# Patient Record
Sex: MALE | Race: WHITE | HISPANIC OR LATINO | Employment: FULL TIME | URBAN - METROPOLITAN AREA
[De-identification: names, ages, dates, MRNs, and addresses within clinical notes are randomized per-mention and may not be internally consistent; named-entity substitution may affect disease eponyms.]

---

## 2019-06-25 ENCOUNTER — HOSPITAL ENCOUNTER (EMERGENCY)
Facility: MEDICAL CENTER | Age: 34
End: 2019-06-25
Attending: EMERGENCY MEDICINE
Payer: COMMERCIAL

## 2019-06-25 ENCOUNTER — APPOINTMENT (OUTPATIENT)
Dept: RADIOLOGY | Facility: MEDICAL CENTER | Age: 34
End: 2019-06-25
Attending: EMERGENCY MEDICINE
Payer: COMMERCIAL

## 2019-06-25 VITALS
HEIGHT: 69 IN | DIASTOLIC BLOOD PRESSURE: 93 MMHG | OXYGEN SATURATION: 96 % | RESPIRATION RATE: 17 BRPM | BODY MASS INDEX: 28.57 KG/M2 | HEART RATE: 69 BPM | WEIGHT: 192.9 LBS | SYSTOLIC BLOOD PRESSURE: 165 MMHG | TEMPERATURE: 97.4 F

## 2019-06-25 DIAGNOSIS — R07.9 CHEST PAIN, UNSPECIFIED TYPE: ICD-10-CM

## 2019-06-25 LAB
ALBUMIN SERPL BCP-MCNC: 4.4 G/DL (ref 3.2–4.9)
ALBUMIN/GLOB SERPL: 1 G/DL
ALP SERPL-CCNC: 72 U/L (ref 30–99)
ALT SERPL-CCNC: 46 U/L (ref 2–50)
ANION GAP SERPL CALC-SCNC: 11 MMOL/L (ref 0–11.9)
AST SERPL-CCNC: 43 U/L (ref 12–45)
BASOPHILS # BLD AUTO: 1.1 % (ref 0–1.8)
BASOPHILS # BLD: 0.04 K/UL (ref 0–0.12)
BILIRUB SERPL-MCNC: 1.3 MG/DL (ref 0.1–1.5)
BUN SERPL-MCNC: 13 MG/DL (ref 8–22)
CALCIUM SERPL-MCNC: 9 MG/DL (ref 8.4–10.2)
CHLORIDE SERPL-SCNC: 99 MMOL/L (ref 96–112)
CO2 SERPL-SCNC: 25 MMOL/L (ref 20–33)
CREAT SERPL-MCNC: 0.73 MG/DL (ref 0.5–1.4)
EKG IMPRESSION: NORMAL
EOSINOPHIL # BLD AUTO: 0.09 K/UL (ref 0–0.51)
EOSINOPHIL NFR BLD: 2.4 % (ref 0–6.9)
ERYTHROCYTE [DISTWIDTH] IN BLOOD BY AUTOMATED COUNT: 46 FL (ref 35.9–50)
GLOBULIN SER CALC-MCNC: 4.2 G/DL (ref 1.9–3.5)
GLUCOSE SERPL-MCNC: 95 MG/DL (ref 65–99)
HCT VFR BLD AUTO: 47.9 % (ref 42–52)
HGB BLD-MCNC: 16.3 G/DL (ref 14–18)
IMM GRANULOCYTES # BLD AUTO: 0.01 K/UL (ref 0–0.11)
IMM GRANULOCYTES NFR BLD AUTO: 0.3 % (ref 0–0.9)
LYMPHOCYTES # BLD AUTO: 1.41 K/UL (ref 1–4.8)
LYMPHOCYTES NFR BLD: 38 % (ref 22–41)
MCH RBC QN AUTO: 30.8 PG (ref 27–33)
MCHC RBC AUTO-ENTMCNC: 34 G/DL (ref 33.7–35.3)
MCV RBC AUTO: 90.4 FL (ref 81.4–97.8)
MONOCYTES # BLD AUTO: 0.54 K/UL (ref 0–0.85)
MONOCYTES NFR BLD AUTO: 14.6 % (ref 0–13.4)
NEUTROPHILS # BLD AUTO: 1.62 K/UL (ref 1.82–7.42)
NEUTROPHILS NFR BLD: 43.6 % (ref 44–72)
NRBC # BLD AUTO: 0 K/UL
NRBC BLD-RTO: 0 /100 WBC
PLATELET # BLD AUTO: 232 K/UL (ref 164–446)
PMV BLD AUTO: 10 FL (ref 9–12.9)
POTASSIUM SERPL-SCNC: 3.6 MMOL/L (ref 3.6–5.5)
PROT SERPL-MCNC: 8.6 G/DL (ref 6–8.2)
RBC # BLD AUTO: 5.3 M/UL (ref 4.7–6.1)
SODIUM SERPL-SCNC: 135 MMOL/L (ref 135–145)
TROPONIN I SERPL-MCNC: <0.02 NG/ML (ref 0–0.04)
WBC # BLD AUTO: 3.7 K/UL (ref 4.8–10.8)

## 2019-06-25 PROCEDURE — 36415 COLL VENOUS BLD VENIPUNCTURE: CPT

## 2019-06-25 PROCEDURE — 93005 ELECTROCARDIOGRAM TRACING: CPT

## 2019-06-25 PROCEDURE — 700102 HCHG RX REV CODE 250 W/ 637 OVERRIDE(OP): Performed by: EMERGENCY MEDICINE

## 2019-06-25 PROCEDURE — 84484 ASSAY OF TROPONIN QUANT: CPT

## 2019-06-25 PROCEDURE — 700105 HCHG RX REV CODE 258: Performed by: EMERGENCY MEDICINE

## 2019-06-25 PROCEDURE — A9270 NON-COVERED ITEM OR SERVICE: HCPCS | Performed by: EMERGENCY MEDICINE

## 2019-06-25 PROCEDURE — 99284 EMERGENCY DEPT VISIT MOD MDM: CPT

## 2019-06-25 PROCEDURE — 85025 COMPLETE CBC W/AUTO DIFF WBC: CPT

## 2019-06-25 PROCEDURE — 80053 COMPREHEN METABOLIC PANEL: CPT

## 2019-06-25 PROCEDURE — 71045 X-RAY EXAM CHEST 1 VIEW: CPT

## 2019-06-25 RX ORDER — SODIUM CHLORIDE 9 MG/ML
1000 INJECTION, SOLUTION INTRAVENOUS ONCE
Status: COMPLETED | OUTPATIENT
Start: 2019-06-25 | End: 2019-06-25

## 2019-06-25 RX ORDER — ASPIRIN 325 MG
325 TABLET ORAL ONCE
Status: COMPLETED | OUTPATIENT
Start: 2019-06-25 | End: 2019-06-25

## 2019-06-25 RX ORDER — IBUPROFEN 200 MG
400 TABLET ORAL EVERY 6 HOURS PRN
COMMUNITY

## 2019-06-25 RX ADMIN — SODIUM CHLORIDE 1000 ML: 9 INJECTION, SOLUTION INTRAVENOUS at 11:36

## 2019-06-25 RX ADMIN — ASPIRIN 325 MG ORAL TABLET 325 MG: 325 PILL ORAL at 11:35

## 2019-06-25 ASSESSMENT — LIFESTYLE VARIABLES: DO YOU DRINK ALCOHOL: NO

## 2019-06-25 NOTE — ED NOTES
Med rec updated and complete  Allergies reviewed  Interviewed pt with wife at bedside with permission from pt.  Pt reports no prescription medications.  Pt reports no antibiotics in the last 2 weeks.

## 2019-06-25 NOTE — ED NOTES
Rounded on patient.  Oriented to the room call light within reach, bed in lowest position. Patient updated on plan of care, no additional needs at this time. Will continue to monitor.  Line and lab established.

## 2019-06-25 NOTE — ED NOTES
Discharge information reviewed in detail. Patient verbalized understanding of discharge instructions to establish a PCP and to return to ER if condition worsens. Self to drive home.   Patient ambulated out of ER in a steady gait.

## 2019-06-25 NOTE — ED PROVIDER NOTES
"ED Provider Note    CHIEF COMPLAINT  Chief Complaint   Patient presents with   • Chest Pain       HPI  Josue Ingram is a 34 y.o. male who presents for evaluation of chest pain.  He has had issues with having chest pain for quite some time 3 years ago he was admitted to the hospital and had extensive cardiac evaluation for this pain ultimately they figured he had an intolerance to shellfish which was causing some significant reflux since then he has avoided it and it is been much better.  This weekend he was celebrating with a friend and had for a number of drinks.  He has been experiencing chest pain since last night.  He describes it is more of a squeezing type of pain that is worse with movement not necessarily exertion no associated shortness of breath or nausea.  He has had some shakiness.  He also reports having a fair amount of stress recently.  He just got .      REVIEW OF SYSTEMS  positive for chest pain shakiness, negative for nausea vomiting shortness of breath. All other systems are negative.     PAST MEDICAL HISTORY       SOCIAL HISTORY  Social History     Social History Main Topics   • Smoking status: Never Smoker   • Smokeless tobacco: Never Used   • Alcohol use Yes   • Drug use: No   • Sexual activity: Not on file       SURGICAL HISTORY   has a past surgical history that includes other orthopedic surgery.    CURRENT MEDICATIONS  Home Medications     Reviewed by Jaquan Nicholson (Pharmacy Tech) on 06/25/19 at 1102  Med List Status: Complete   Medication Last Dose Status   Cholecalciferol (VITAMIN D3 PO) > 2 weeks Active   ibuprofen (MOTRIN) 200 MG Tab 6/24/2019 Active                ALLERGIES  Allergies   Allergen Reactions   • Shellfish Allergy Swelling     Pt reports that he swells up and that he gets a knot in his stomach.       PHYSICAL EXAM  VITAL SIGNS: BP (!) 165/93   Pulse 69   Temp 36.3 °C (97.4 °F) (Temporal)   Resp 17   Ht 1.753 m (5' 9\")   Wt 87.5 kg (192 lb 14.4 oz)  "  SpO2 96%   BMI 28.49 kg/m² .  Constitutional: Alert in no apparent distress.  HENT: No signs of trauma, Bilateral external ears normal, Nose normal.  Dry mucous membranes  Eyes: Pupils are equal and reactive, Conjunctiva normal, Non-icteric.   Neck: Normal range of motion, No tenderness, Supple, No stridor.   Cardiovascular: Regular rate and rhythm, no murmurs.   Thorax & Lungs: Normal breath sounds, No respiratory distress, No wheezing, No chest tenderness.   Abdomen: Bowel sounds normal, Soft, No tenderness, No masses, No peritoneal signs.  Skin: Warm, Dry, No erythema, No rash.   Musculoskeletal:  no major deformities noted.   Neurologic: Alert,  No focal deficits noted.   Psychiatric: Affect normal, Judgment normal, Mood normal.       DIAGNOSTIC STUDIES / PROCEDURES      EKG  12 Lead EKG interpreted by me to show:  Normal sinus rhythm  Rate 95  Axis: Normal  Intervals: Normal  T wave inversion leads II III, aVF, V5 V6 with some J-point elevation V1 through V4  My impression of this EKG: Does not indicate ischemia or arrythmia at this time.  Prior EKG brought by patient from 2016 indicates similar pattern.      LABS  Labs Reviewed   CBC WITH DIFFERENTIAL - Abnormal; Notable for the following:        Result Value    WBC 3.7 (*)     Neutrophils-Polys 43.60 (*)     Monocytes 14.60 (*)     Neutrophils (Absolute) 1.62 (*)     All other components within normal limits   COMP METABOLIC PANEL - Abnormal; Notable for the following:     Total Protein 8.6 (*)     Globulin 4.2 (*)     All other components within normal limits   TROPONIN   ESTIMATED GFR       RADIOLOGY  DX-CHEST-PORTABLE (1 VIEW)   Final Result         1. No acute cardiopulmonary abnormalities are identified.              COURSE & MEDICAL DECISION MAKING  Pertinent Labs & Imaging studies reviewed. (See chart for details)  This is a 34-year-old gentleman who presents with chest pain.  Differential includes but is not limited to atypical chest pain, ACS,  stress.    Records from 2016 were brought by the patient there is an exercise stress test that was negative they did note a 1/2 mm ST depression noted in the inferior leads that resolved with recovery.    Labs here show normal troponin.  Labs are otherwise unremarkable EKG is unchanged from previous and chest x-ray is negative.  He has a heart score of 1.  Given this normal troponin given his symptoms have been lasting since last night I do think this is reassuring.  I feel he is otherwise low risk for ACS and can be discharged with follow-up with primary care provider.  Patient is agreeable to this.    HYDRATION: Based on the patient's presentation of Dehydration the patient was given IV fluids. IV Hydration was used because oral hydration was not as rapid as required. Upon recheck following hydration, the patient was Improved.       The patient will return for new or worsening symptoms and is stable at the time of discharge. Patient was given return precautions. Patient and/or family member verbalizes understanding and will comply.    DISPOSITION:  Patient will be discharged home in stable condition.    FOLLOW UP:  [facility] Emergency Dept  [address]  [facility] [address] [phone]    Return for worsening chest pain, shortness of breath, fevers or other concerns    primary care      Please follow-up your blood pressure was elevated      OUTPATIENT MEDICATIONS:  Discharge Medication List as of 6/25/2019  1:02 PM            Your blood pressure is elevated here in the emergency department. Please monitor your blood pressure over the next several days. If your blood pressure continues to be 120/80 or higher please contact your physician for blood pressure management.       FINAL IMPRESSION  1. Chest pain, unspecified type        2.   3.    This dictation has been creating using voice recognition software. The accuracy of the dictation is limited the abilities of the software.   I expect there may be some errors of grammar and possibly content. I made every attempt to manually correct the errors within my dictation. However errors related to this voice recognition software may still exist and should be interpreted within the appropriate context.      The note accurately reflects work and decisions made by me.  Veronica Lees  6/25/2019  2:40 PM

## 2019-06-26 ENCOUNTER — TELEPHONE (OUTPATIENT)
Dept: SCHEDULING | Facility: IMAGING CENTER | Age: 34
End: 2019-06-26

## 2019-07-22 ENCOUNTER — OFFICE VISIT (OUTPATIENT)
Dept: MEDICAL GROUP | Facility: MEDICAL CENTER | Age: 34
End: 2019-07-22
Payer: COMMERCIAL

## 2019-07-22 VITALS
SYSTOLIC BLOOD PRESSURE: 112 MMHG | OXYGEN SATURATION: 94 % | RESPIRATION RATE: 16 BRPM | DIASTOLIC BLOOD PRESSURE: 80 MMHG | HEART RATE: 80 BPM | HEIGHT: 69 IN | WEIGHT: 180.78 LBS | BODY MASS INDEX: 26.78 KG/M2 | TEMPERATURE: 97.9 F

## 2019-07-22 DIAGNOSIS — Z76.89 ENCOUNTER TO ESTABLISH CARE: ICD-10-CM

## 2019-07-22 DIAGNOSIS — E55.9 VITAMIN D DEFICIENCY: ICD-10-CM

## 2019-07-22 DIAGNOSIS — R07.9 CHEST PAIN, UNSPECIFIED TYPE: ICD-10-CM

## 2019-07-22 DIAGNOSIS — Z91.013 SHELLFISH ALLERGY: ICD-10-CM

## 2019-07-22 DIAGNOSIS — Z00.00 PREVENTATIVE HEALTH CARE: ICD-10-CM

## 2019-07-22 PROCEDURE — 99214 OFFICE O/P EST MOD 30 MIN: CPT | Performed by: PHYSICIAN ASSISTANT

## 2019-07-22 RX ORDER — OMEPRAZOLE 20 MG/1
20 CAPSULE, DELAYED RELEASE ORAL DAILY
Qty: 30 CAP | Refills: 2 | Status: SHIPPED | OUTPATIENT
Start: 2019-07-22

## 2019-07-22 ASSESSMENT — PATIENT HEALTH QUESTIONNAIRE - PHQ9: CLINICAL INTERPRETATION OF PHQ2 SCORE: 0

## 2019-07-22 NOTE — ASSESSMENT & PLAN NOTE
This is a 34-year-old male who is here today to establish care.  Has a chronic history of intermittent chest pain.  Was seen in the [place] by cardiology.  Had a stress test that was negative.  Supposedly had an echocardiogram that was negative.  Chest pain is unspecified but it does occur in the left side.  He was recently seen at the ER and troponin level was normal.  EKG was considered normal.  There is a thought that possibly his chest pain was secondary to reflux.  In the [place] he had an upper endoscopy.  He is also started on omeprazole.  His chest pain did dissipate but he moved into [place] recently.  Complains of stress at times.  Is a  at the [facility] [employer].  They are currently renovating the [facility].

## 2019-07-22 NOTE — ASSESSMENT & PLAN NOTE
Vitamin D level was at an 8.  He was started on high dose of vitamin D.  Currently is taking 10,000 units of vitamin D one day a week.  No recent follow-up level.

## 2019-07-22 NOTE — PROGRESS NOTES
Subjective:   Josue Ingram is a 34 y.o. male here today for establishing care, chronic history of intermittent chest pain, shellfish allergy and vitamin D deficiency.    Chest pain  This is a 34-year-old male who is here today to establish care.  Has a chronic history of intermittent chest pain.  Was seen in the [place] by cardiology.  Had a stress test that was negative.  Supposedly had an echocardiogram that was negative.  Chest pain is unspecified but it does occur in the left side.  He was recently seen at the ER and troponin level was normal.  EKG was considered normal.  There is a thought that possibly his chest pain was secondary to reflux.  In the [place] he had an upper endoscopy.  He is also started on omeprazole.  His chest pain did dissipate but he moved into [place] recently.  Complains of stress at times.  Is a  at the [employer].  They are currently renovating the [employer] [employer] [employer].    Shellfish allergy  During the work-up of his chest pain he was told that he an allergy to shellfish.  This could be contributing to his chest pain.  He gave up shellfish.  He is requesting a referral to see an allergist.    Vitamin D deficiency  Vitamin D level was at an 8.  He was started on high dose of vitamin D.  Currently is taking 10,000 units of vitamin D one day a week.  No recent follow-up level.       Current medicines (including changes today)  Current Outpatient Prescriptions   Medication Sig Dispense Refill   • aspirin EC (ECOTRIN) 81 MG Tablet Delayed Response Take 81 mg by mouth every day.     • omeprazole (PRILOSEC) 20 MG delayed-release capsule Take 1 Cap by mouth every day. 1/2 hour prior to same meal. 30 Cap 2   • Cholecalciferol (VITAMIN D3 PO) Take 1 Cap by mouth every day.     • ibuprofen (MOTRIN) 200 MG Tab Take 400 mg by mouth every 6 hours as needed for Mild Pain.       No current facility-administered medications for this visit.      He  has no past medical history on file.    Social History and  "Family History were reviewed and updated.    ROS   No chest pain, no shortness of breath, no abdominal pain and all other systems were reviewed and are negative.       Objective:     /80 (BP Location: Left arm, Patient Position: Sitting, BP Cuff Size: Adult)   Pulse 80   Temp 36.6 °C (97.9 °F) (Temporal)   Resp 16   Ht 1.753 m (5' 9\")   Wt 82 kg (180 lb 12.4 oz)   SpO2 94%  Body mass index is 26.7 kg/m².   Physical Exam:  Constitutional: Alert, no distress.  Skin: Warm, dry, good turgor, no rashes in visible areas.  Eye: Equal, round and reactive, conjunctiva clear, lids normal.  ENMT: Lips without lesions, good dentition, oropharynx clear.  Neck: Trachea midline, no masses.   Lymph: No cervical or supraclavicular lymphadenopathy  Respiratory: Unlabored respiratory effort, lungs clear to auscultation, no wheezes, no ronchi.  Cardiovascular: Normal S1, S2, no murmur, no edema.  Abdomen: Soft, non-tender, no masses.  Psych: Alert and oriented x3, normal affect and mood.        Assessment and Plan:   The following treatment plan was discussed    1. Chest pain, unspecified type  Chronic condition.  Intermittent.  Cardiac versus noncardiac.  Advised to start omeprazole 20 mg half hour prior to the same meal daily.  Referred to cardiology for evaluation.  Suspect of chest pain dramatically improved on the omeprazole likely this is reflux or gas production.  - REFERRAL TO CARDIOLOGY  - omeprazole (PRILOSEC) 20 MG delayed-release capsule; Take 1 Cap by mouth every day. 1/2 hour prior to same meal.  Dispense: 30 Cap; Refill: 2    2. Shellfish allergy  Chronic condition.  Status unknown.  Ordered shellfish allergy profile.  Will not defer to allergy.  - ALLERGEN, SHELLFISH PROFILE; Future    3. Vitamin D deficiency  Chronic condition.  Status unknown.  Continue vitamin D supplement.  Ordered vitamin D level.  - VITAMIN D,25 HYDROXY; Future    4. Preventative health care  Team.  He will be contacted.  - Lipid " Profile; Future  - Comp Metabolic Panel; Future    5. Encounter to establish care      Followup: Return in about 3 months (around 10/22/2019).    Please note that this dictation was created using voice recognition software. I have made every reasonable attempt to correct obvious errors, but I expect that there are errors of grammar and possibly content that I did not discover before finalizing the note.

## 2019-07-22 NOTE — ASSESSMENT & PLAN NOTE
During the work-up of his chest pain he was told that he an allergy to shellfish.  This could be contributing to his chest pain.  He gave up shellfish.  He is requesting a referral to see an allergist.

## 2019-08-15 ENCOUNTER — HOSPITAL ENCOUNTER (OUTPATIENT)
Dept: LAB | Facility: MEDICAL CENTER | Age: 34
End: 2019-08-15
Attending: PHYSICIAN ASSISTANT
Payer: COMMERCIAL

## 2019-08-15 DIAGNOSIS — Z00.00 PREVENTATIVE HEALTH CARE: ICD-10-CM

## 2019-08-15 DIAGNOSIS — E55.9 VITAMIN D DEFICIENCY: ICD-10-CM

## 2019-08-15 DIAGNOSIS — Z91.013 SHELLFISH ALLERGY: ICD-10-CM

## 2019-08-15 LAB
25(OH)D3 SERPL-MCNC: 15 NG/ML (ref 30–100)
ALBUMIN SERPL BCP-MCNC: 4.4 G/DL (ref 3.2–4.9)
ALBUMIN/GLOB SERPL: 1.3 G/DL
ALP SERPL-CCNC: 65 U/L (ref 30–99)
ALT SERPL-CCNC: 37 U/L (ref 2–50)
ANION GAP SERPL CALC-SCNC: 7 MMOL/L (ref 0–11.9)
AST SERPL-CCNC: 25 U/L (ref 12–45)
BILIRUB SERPL-MCNC: 0.7 MG/DL (ref 0.1–1.5)
BUN SERPL-MCNC: 13 MG/DL (ref 8–22)
CALCIUM SERPL-MCNC: 9.6 MG/DL (ref 8.5–10.5)
CHLORIDE SERPL-SCNC: 104 MMOL/L (ref 96–112)
CHOLEST SERPL-MCNC: 168 MG/DL (ref 100–199)
CO2 SERPL-SCNC: 28 MMOL/L (ref 20–33)
CREAT SERPL-MCNC: 0.72 MG/DL (ref 0.5–1.4)
FASTING STATUS PATIENT QL REPORTED: NORMAL
GLOBULIN SER CALC-MCNC: 3.5 G/DL (ref 1.9–3.5)
GLUCOSE SERPL-MCNC: 95 MG/DL (ref 65–99)
HDLC SERPL-MCNC: 50 MG/DL
LDLC SERPL CALC-MCNC: 96 MG/DL
POTASSIUM SERPL-SCNC: 3.9 MMOL/L (ref 3.6–5.5)
PROT SERPL-MCNC: 7.9 G/DL (ref 6–8.2)
SODIUM SERPL-SCNC: 139 MMOL/L (ref 135–145)
TRIGL SERPL-MCNC: 110 MG/DL (ref 0–149)

## 2019-08-15 PROCEDURE — 80053 COMPREHEN METABOLIC PANEL: CPT

## 2019-08-15 PROCEDURE — 80061 LIPID PANEL: CPT

## 2019-08-15 PROCEDURE — 82306 VITAMIN D 25 HYDROXY: CPT

## 2019-08-15 PROCEDURE — 86003 ALLG SPEC IGE CRUDE XTRC EA: CPT | Mod: 91

## 2019-08-15 PROCEDURE — 36415 COLL VENOUS BLD VENIPUNCTURE: CPT

## 2019-08-18 LAB
BLUE MUSSEL IGE QN: <0.1 KU/L
CLAM IGE QN: <0.1 KU/L
CRAB IGE QN: 0.77 KU/L
DEPRECATED MISC ALLERGEN IGE RAST QL: ABNORMAL
LOBSTER IGE QN: 0.6 KU/L
OYSTER IGE QN: <0.1 KU/L
SCALLOP IGE QN: <0.1 KU/L
SHRIMP IGE QN: 4.37 KU/L

## 2019-08-19 DIAGNOSIS — R79.89 ABNORMAL CBC: ICD-10-CM

## 2019-08-21 ENCOUNTER — TELEPHONE (OUTPATIENT)
Dept: MEDICAL GROUP | Facility: MEDICAL CENTER | Age: 34
End: 2019-08-21

## 2019-08-21 ENCOUNTER — NON-PROVIDER VISIT (OUTPATIENT)
Dept: MEDICAL GROUP | Facility: MEDICAL CENTER | Age: 34
End: 2019-08-21
Payer: COMMERCIAL

## 2019-08-21 DIAGNOSIS — Z23 IMMUNIZATION DUE: ICD-10-CM

## 2019-08-21 PROCEDURE — 90471 IMMUNIZATION ADMIN: CPT | Performed by: NURSE PRACTITIONER

## 2019-08-21 PROCEDURE — 90715 TDAP VACCINE 7 YRS/> IM: CPT | Performed by: NURSE PRACTITIONER

## 2019-08-21 NOTE — TELEPHONE ENCOUNTER
Patient is on the [facility] Schedule today for T-Dap vaccine/injection.    SPECIFIC Action To Be Taken: Orders pending, please sign.

## 2019-08-21 NOTE — NON-PROVIDER
"Fadi Ingram is a 34 y.o. male here for a non-provider visit for:   TDAP    Reason for immunization: continue or complete series started at the office  Immunization records indicate need for vaccine: Yes, confirmed with Epic  Minimum interval has been met for this vaccine: Yes  ABN completed: Not Indicated    Order and dose verified by: TRACEY BIANCHI  VIS Dated  02/24/2015 was given to patient: Yes  All IAC Questionnaire questions were answered \"No.\"    Patient tolerated injection and no adverse effects were observed or reported: Yes    Pt scheduled for next dose in series: Not Indicated    "

## 2019-08-22 ENCOUNTER — APPOINTMENT (OUTPATIENT)
Dept: MEDICAL GROUP | Facility: MEDICAL CENTER | Age: 34
End: 2019-08-22
Payer: COMMERCIAL

## 2019-09-16 ENCOUNTER — OFFICE VISIT (OUTPATIENT)
Dept: CARDIOLOGY | Facility: MEDICAL CENTER | Age: 34
End: 2019-09-16
Payer: COMMERCIAL

## 2019-09-16 VITALS
OXYGEN SATURATION: 95 % | HEART RATE: 92 BPM | DIASTOLIC BLOOD PRESSURE: 88 MMHG | WEIGHT: 178 LBS | SYSTOLIC BLOOD PRESSURE: 128 MMHG | HEIGHT: 69 IN | BODY MASS INDEX: 26.36 KG/M2

## 2019-09-16 DIAGNOSIS — R94.31 ABNORMAL EKG: ICD-10-CM

## 2019-09-16 DIAGNOSIS — R07.2 PRECORDIAL PAIN: ICD-10-CM

## 2019-09-16 PROCEDURE — 93000 ELECTROCARDIOGRAM COMPLETE: CPT | Performed by: INTERNAL MEDICINE

## 2019-09-16 PROCEDURE — 99204 OFFICE O/P NEW MOD 45 MIN: CPT | Performed by: INTERNAL MEDICINE

## 2019-09-16 RX ORDER — TRAZODONE HYDROCHLORIDE 50 MG/1
TABLET ORAL
COMMUNITY
Start: 2018-02-21 | End: 2020-02-21

## 2019-09-16 ASSESSMENT — ENCOUNTER SYMPTOMS
NEUROLOGICAL NEGATIVE: 1
CONSTITUTIONAL NEGATIVE: 1
HEARTBURN: 1
MUSCULOSKELETAL NEGATIVE: 1
SHORTNESS OF BREATH: 1
PSYCHIATRIC NEGATIVE: 1
EYES NEGATIVE: 1

## 2019-09-16 NOTE — PROGRESS NOTES
Chief Complaint   Patient presents with   • Chest Pain       Subjective:   Fadi Ingram is a 34 y.o. male who presents today for evaluation of chest pain    He is seen in consultation at the request of Crescencio Cramer PA-C for above issue.  He has no known major medical problem.  He used to work out regularly.  He has been experiencing chest pain for the last few months.  He described them as picnhing pain in the left lower chest, lasting a minute or so  They occur randomly without associated symptoms.  Denies chest trauma or recent flu-like symptoms.    His sister has open heart surgery for congenital heart disease but no FH of premature CAD.    Has been busy and not sleeping well    EKG 6/25 by my review showed sinus rhythm with high QRS voltage and mild inferolateral ST abnormalities    LDL last month 96, HDL 50      History reviewed. No pertinent past medical history.  Past Surgical History:   Procedure Laterality Date   • OTHER ORTHOPEDIC SURGERY       Family History   Problem Relation Age of Onset   • Heart Disease Mother    • Heart Disease Sister      Social History     Socioeconomic History   • Marital status:      Spouse name: Not on file   • Number of children: Not on file   • Years of education: Not on file   • Highest education level: Not on file   Occupational History   • Not on file   Social Needs   • Financial resource strain: Not on file   • Food insecurity:     Worry: Not on file     Inability: Not on file   • Transportation needs:     Medical: Not on file     Non-medical: Not on file   Tobacco Use   • Smoking status: Never Smoker   • Smokeless tobacco: Never Used   Substance and Sexual Activity   • Alcohol use: Yes     Alcohol/week: 4.2 oz     Types: 7 Glasses of wine per week   • Drug use: No   • Sexual activity: Yes     Partners: Female     Comment: , no children   Lifestyle   • Physical activity:     Days per week: Not on file     Minutes per session: Not on file   • Stress: Not on  "file   Relationships   • Social connections:     Talks on phone: Not on file     Gets together: Not on file     Attends Tenriism service: Not on file     Active member of club or organization: Not on file     Attends meetings of clubs or organizations: Not on file     Relationship status: Not on file   • Intimate partner violence:     Fear of current or ex partner: Not on file     Emotionally abused: Not on file     Physically abused: Not on file     Forced sexual activity: Not on file   Other Topics Concern   • Not on file   Social History Narrative   • Not on file     Allergies   Allergen Reactions   • Shellfish Allergy Swelling     Pt reports that he swells up and that he gets a knot in his stomach.     Outpatient Encounter Medications as of 9/16/2019   Medication Sig Dispense Refill   • traZODone (DESYREL) 50 MG Tab Take  by mouth.     • aspirin EC (ECOTRIN) 81 MG Tablet Delayed Response Take 81 mg by mouth every day.     • omeprazole (PRILOSEC) 20 MG delayed-release capsule Take 1 Cap by mouth every day. 1/2 hour prior to same meal. 30 Cap 2   • Cholecalciferol (VITAMIN D3 PO) Take 1 Cap by mouth every day.     • ibuprofen (MOTRIN) 200 MG Tab Take 400 mg by mouth every 6 hours as needed for Mild Pain.       No facility-administered encounter medications on file as of 9/16/2019.      Review of Systems   Constitutional: Negative.    HENT: Negative.    Eyes: Negative.    Respiratory: Positive for shortness of breath.    Cardiovascular: Positive for chest pain.   Gastrointestinal: Positive for heartburn.        Better after taking omeprazole   Genitourinary: Negative.    Musculoskeletal: Negative.    Skin: Negative.    Neurological: Negative.    Endo/Heme/Allergies: Negative.    Psychiatric/Behavioral: Negative.    All other systems reviewed and are negative.       Objective:   /88 (BP Location: Left arm, Patient Position: Sitting, BP Cuff Size: Adult)   Pulse 92   Ht 1.753 m (5' 9\")   Wt 80.7 kg (178 lb) "   SpO2 95%   BMI 26.29 kg/m²     Physical Exam   Constitutional: He is oriented to person, place, and time. He appears well-developed and well-nourished. No distress.   HENT:   Head: Normocephalic and atraumatic.   Eyes: Right eye exhibits no discharge. Left eye exhibits no discharge.   Neck: No JVD present. No thyromegaly present.   Cardiovascular: Normal rate and normal heart sounds.   Pulmonary/Chest: Effort normal and breath sounds normal. He exhibits no tenderness.   Abdominal: Soft. There is no tenderness.   Musculoskeletal: He exhibits no edema or deformity.   Neurological: He is alert and oriented to person, place, and time.   Skin: Skin is warm. No erythema.   Psychiatric: He has a normal mood and affect.     Repeated EKG today by my review unchanged from 6/25    Assessment:     1. Precordial pain  EKG    EC-ECHOCARDIOGRAM REST/STRESS W/ CONT   2. Abnormal EKG  EC-ECHOCARDIOGRAM REST/STRESS W/ CONT       Medical Decision Making:  Today's Assessment / Status / Plan:     His chest pain is atypical. He is rather concerned about it due to the fact that he likes to be very active. Will arrange for stress test. He does have abnormal resting EKG.  Will obtain stress echocardiography which will also allow us to assess for LV wall thichness and rule out hypertrophic cardiomyopathy/athelerth heart type issue.  We will keep you posted about our findings and further recommendations as they become available. Please also do not hesitate to call for any questions.  Thank you kindly for allowing me to participate in the care of this patient.

## 2019-09-17 LAB — EKG IMPRESSION: NORMAL

## 2019-10-18 ENCOUNTER — HOSPITAL ENCOUNTER (OUTPATIENT)
Dept: LAB | Facility: MEDICAL CENTER | Age: 34
End: 2019-10-18
Attending: PHYSICIAN ASSISTANT
Payer: COMMERCIAL

## 2019-10-18 ENCOUNTER — OFFICE VISIT (OUTPATIENT)
Dept: MEDICAL GROUP | Facility: MEDICAL CENTER | Age: 34
End: 2019-10-18
Payer: COMMERCIAL

## 2019-10-18 VITALS
BODY MASS INDEX: 27.43 KG/M2 | RESPIRATION RATE: 12 BRPM | TEMPERATURE: 98.9 F | DIASTOLIC BLOOD PRESSURE: 80 MMHG | HEIGHT: 69 IN | SYSTOLIC BLOOD PRESSURE: 162 MMHG | OXYGEN SATURATION: 98 % | HEART RATE: 85 BPM | WEIGHT: 185.19 LBS

## 2019-10-18 DIAGNOSIS — R53.83 OTHER FATIGUE: ICD-10-CM

## 2019-10-18 DIAGNOSIS — R55 SYNCOPE, UNSPECIFIED SYNCOPE TYPE: ICD-10-CM

## 2019-10-18 DIAGNOSIS — E55.9 VITAMIN D DEFICIENCY: ICD-10-CM

## 2019-10-18 DIAGNOSIS — R56.9 SEIZURE-LIKE ACTIVITY (HCC): ICD-10-CM

## 2019-10-18 DIAGNOSIS — R07.2 PRECORDIAL PAIN: ICD-10-CM

## 2019-10-18 LAB
ALBUMIN SERPL BCP-MCNC: 4.5 G/DL (ref 3.2–4.9)
ALBUMIN/GLOB SERPL: 1.5 G/DL
ALP SERPL-CCNC: 69 U/L (ref 30–99)
ALT SERPL-CCNC: 37 U/L (ref 2–50)
ANION GAP SERPL CALC-SCNC: 5 MMOL/L (ref 0–11.9)
AST SERPL-CCNC: 27 U/L (ref 12–45)
BASOPHILS # BLD AUTO: 1 % (ref 0–1.8)
BASOPHILS # BLD: 0.05 K/UL (ref 0–0.12)
BILIRUB SERPL-MCNC: 0.5 MG/DL (ref 0.1–1.5)
BUN SERPL-MCNC: 11 MG/DL (ref 8–22)
CALCIUM SERPL-MCNC: 9.2 MG/DL (ref 8.5–10.5)
CHLORIDE SERPL-SCNC: 103 MMOL/L (ref 96–112)
CK SERPL-CCNC: 159 U/L (ref 0–154)
CO2 SERPL-SCNC: 26 MMOL/L (ref 20–33)
CREAT SERPL-MCNC: 0.68 MG/DL (ref 0.5–1.4)
EOSINOPHIL # BLD AUTO: 0.06 K/UL (ref 0–0.51)
EOSINOPHIL NFR BLD: 1.2 % (ref 0–6.9)
ERYTHROCYTE [DISTWIDTH] IN BLOOD BY AUTOMATED COUNT: 42.5 FL (ref 35.9–50)
FASTING STATUS PATIENT QL REPORTED: NORMAL
GLOBULIN SER CALC-MCNC: 3 G/DL (ref 1.9–3.5)
GLUCOSE SERPL-MCNC: 82 MG/DL (ref 65–99)
HCT VFR BLD AUTO: 47.1 % (ref 42–52)
HGB BLD-MCNC: 15.4 G/DL (ref 14–18)
IMM GRANULOCYTES # BLD AUTO: 0.02 K/UL (ref 0–0.11)
IMM GRANULOCYTES NFR BLD AUTO: 0.4 % (ref 0–0.9)
LYMPHOCYTES # BLD AUTO: 2.18 K/UL (ref 1–4.8)
LYMPHOCYTES NFR BLD: 45.2 % (ref 22–41)
MCH RBC QN AUTO: 30.4 PG (ref 27–33)
MCHC RBC AUTO-ENTMCNC: 32.7 G/DL (ref 33.7–35.3)
MCV RBC AUTO: 92.9 FL (ref 81.4–97.8)
MONOCYTES # BLD AUTO: 0.59 K/UL (ref 0–0.85)
MONOCYTES NFR BLD AUTO: 12.2 % (ref 0–13.4)
NEUTROPHILS # BLD AUTO: 1.92 K/UL (ref 1.82–7.42)
NEUTROPHILS NFR BLD: 40 % (ref 44–72)
NRBC # BLD AUTO: 0 K/UL
NRBC BLD-RTO: 0 /100 WBC
PLATELET # BLD AUTO: 212 K/UL (ref 164–446)
PMV BLD AUTO: 11 FL (ref 9–12.9)
POTASSIUM SERPL-SCNC: 3.8 MMOL/L (ref 3.6–5.5)
PROT SERPL-MCNC: 7.5 G/DL (ref 6–8.2)
RBC # BLD AUTO: 5.07 M/UL (ref 4.7–6.1)
SODIUM SERPL-SCNC: 134 MMOL/L (ref 135–145)
TSH SERPL DL<=0.005 MIU/L-ACNC: 1.21 UIU/ML (ref 0.38–5.33)
WBC # BLD AUTO: 4.8 K/UL (ref 4.8–10.8)

## 2019-10-18 PROCEDURE — 82550 ASSAY OF CK (CPK): CPT

## 2019-10-18 PROCEDURE — 36415 COLL VENOUS BLD VENIPUNCTURE: CPT

## 2019-10-18 PROCEDURE — 85025 COMPLETE CBC W/AUTO DIFF WBC: CPT

## 2019-10-18 PROCEDURE — 99214 OFFICE O/P EST MOD 30 MIN: CPT | Performed by: PHYSICIAN ASSISTANT

## 2019-10-18 PROCEDURE — 80053 COMPREHEN METABOLIC PANEL: CPT

## 2019-10-18 PROCEDURE — 84443 ASSAY THYROID STIM HORMONE: CPT

## 2019-10-18 NOTE — ASSESSMENT & PLAN NOTE
Still has intermittent chest pain.  It is a brief squeezing sensation of the left chest wall.  He saw cardiology.  A stress test was ordered.  States he was 5 minutes late and they canceled his procedure.  He was upset because he notified them that he was running late.  He has not yet to follow-up for another appointment.  He is still concerned about this chest pain.  Denies any known triggers.  Had a full cardiology work-up in the [place].  Concerned that possibly may be related to heartburn or reflux.  Previously based upon his history I placed him on omeprazole given his history of heartburn.  Takes the medication the morning.  He will take it after breakfast.  Occasionally he will have a knot in his stomach.  He carries Rolaids which are effective.  His wife believes may be the excessive alcohol during the weekends as a concern.  He states he will have 4-5 on Saturday and then some more on Sunday.  He will stay out late.  This week he is trying to be more healthy.
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
## 2019-10-18 NOTE — PROGRESS NOTES
Subjective:   Josue Ingram is a 34 y.o. male here today for syncopal episode on Sunday, chronic chest pain and fatigue.    Syncope  This is a 34-year-old male who on Sunday was driving in the passenger seat of his car.  His wife was driving.  States he was doing fine.  Was not stressed about anything.  He starts to feel lightheaded and dizzy.  Started to feel sick.  Then he blacked out.  His wife and a friend of his were concerned that he had a seizure.  His body got tense.  He states that he was shaking a little bit.  He woke up 30 seconds later and shortly after that contacted me in my chart regarding this event.  Has not had an episode in the past.  Since Sunday he has been really fatigued.  Complains of pain in the back of his neck down his spine.  He states during his blackout his wife as well as his friend were pulling on him and may have caused some muscle injury.  He has had no further episodes.  On my advice he did not go to the emergency room.    Chest pain  Still has intermittent chest pain.  It is a brief squeezing sensation of the left chest wall.  He saw cardiology.  A stress test was ordered.  States he was 5 minutes late and they canceled his procedure.  He was upset because he notified them that he was running late.  He has not yet to follow-up for another appointment.  He is still concerned about this chest pain.  Denies any known triggers.  Had a full cardiology work-up in the [place].  Concerned that possibly may be related to heartburn or reflux.  Previously based upon his history I placed him on omeprazole given his history of heartburn.  Takes the medication the morning.  He will take it after breakfast.  Occasionally he will have a knot in his stomach.  He carries Rolaids which are effective.  His wife believes may be the excessive alcohol during the weekends as a concern.  He states he will have 4-5 on Saturday and then some more on Sunday.  He will stay out late.  This week he is trying to  "be more healthy.    Vitamin D deficiency  Taking an over-the-counter vitamin D supplement.  Recent vitamin D level at 15.       Current medicines (including changes today)  Current Outpatient Medications   Medication Sig Dispense Refill   • traZODone (DESYREL) 50 MG Tab Take  by mouth.     • aspirin EC (ECOTRIN) 81 MG Tablet Delayed Response Take 81 mg by mouth every day.     • omeprazole (PRILOSEC) 20 MG delayed-release capsule Take 1 Cap by mouth every day. 1/2 hour prior to same meal. 30 Cap 2   • Cholecalciferol (VITAMIN D3 PO) Take 1 Cap by mouth every day.     • ibuprofen (MOTRIN) 200 MG Tab Take 400 mg by mouth every 6 hours as needed for Mild Pain.       No current facility-administered medications for this visit.      He  has no past medical history of Diabetes (HCC), Hyperlipidemia, or Hypertension.    Social History and Family History were reviewed and updated.    ROS   No chest pain, no shortness of breath, no abdominal pain and all other systems were reviewed and are negative.       Objective:     BP (!) 162/80 (BP Location: Left arm, Patient Position: Sitting, BP Cuff Size: Adult)   Pulse 85   Temp 37.2 °C (98.9 °F) (Temporal)   Resp 12   Ht 1.753 m (5' 9\")   Wt 84 kg (185 lb 3 oz)   SpO2 98%  Body mass index is 27.35 kg/m².   Physical Exam:  Constitutional: Alert, no distress.  Skin: Warm, dry, good turgor, no rashes in visible areas.  Eye: Equal, round and reactive, conjunctiva clear, lids normal.  ENMT: Lips without lesions, good dentition, oropharynx clear.  Neck: Trachea midline, no masses.   Lymph: No cervical or supraclavicular lymphadenopathy  Respiratory: Unlabored respiratory effort, lungs appear clear, no wheezes.  Cardiovascular: Regular rate and rhythm.    Psych: Alert and oriented x3, normal affect and mood.        Assessment and Plan:   The following treatment plan was discussed    1. Syncope, unspecified syncope type  Acute, new onset condition.  Approximate 30 second syncopal " episode.  No triggering noted.  Unknown if a true seizure.  Unlikely but he does exhibit some post syncopal symptoms such as fatigue and body ache afterwards.  Ordered CMP, CBC and CPK.  Also ordered EEG.  Next time he has a similar episode he is to go to the ER.  - Comp Metabolic Panel; Future  - REFERRAL TO NEURODIAGNOSTICS (EEG,EP,EMG/NCS/DBS) Modality Requested: EEG    2. Seizure-like activity (HCC)  Acute, new onset condition.  Unknown if a true seizure.  Work-up with labs and EEG.  Will hold off any referral to neurology.  - CREATINE KINASE; Future  - Comp Metabolic Panel; Future  - REFERRAL TO NEURODIAGNOSTICS (EEG,EP,EMG/NCS/DBS) Modality Requested: EEG  - CBC WITH DIFFERENTIAL; Future    3. Vitamin D deficiency  Chronic condition.  Continue over-the-counter vitamin D supplement daily.    4. Precordial pain  Chronic condition.  Unknown etiology.  Advised him to follow-up with the stress test.  Make another appointment.  Also referred to GI to establish care.  Also suggest taking omeprazole half hour prior to a more substantial meals such as lunch.  - REFERRAL TO GASTROENTEROLOGY  - CBC WITH DIFFERENTIAL; Future    5. Other fatigue  Acute, new onset condition status post syncopal episode versus seizure.  Ordered labs.  Will also evaluate thyroid.  Advised for him to decrease alcohol use during the weekends.  No more than 2 daily.  - TSH WITH REFLEX TO FT4; Future      Followup: Return if symptoms worsen or fail to improve.    Please note that this dictation was created using voice recognition software. I have made every reasonable attempt to correct obvious errors, but I expect that there are errors of grammar and possibly content that I did not discover before finalizing the note.

## 2019-11-05 ENCOUNTER — HOSPITAL ENCOUNTER (OUTPATIENT)
Dept: CARDIOLOGY | Facility: MEDICAL CENTER | Age: 34
End: 2019-11-05
Attending: INTERNAL MEDICINE
Payer: COMMERCIAL

## 2019-11-05 DIAGNOSIS — R07.2 PRECORDIAL PAIN: ICD-10-CM

## 2019-11-05 DIAGNOSIS — R94.31 ABNORMAL EKG: ICD-10-CM

## 2019-11-05 LAB — LV EJECT FRACT  99904: 65

## 2019-11-05 PROCEDURE — 93018 CV STRESS TEST I&R ONLY: CPT | Performed by: INTERNAL MEDICINE

## 2019-11-05 PROCEDURE — 93350 STRESS TTE ONLY: CPT | Mod: 26 | Performed by: INTERNAL MEDICINE

## 2019-11-05 PROCEDURE — 93017 CV STRESS TEST TRACING ONLY: CPT

## 2019-11-18 ENCOUNTER — TELEPHONE (OUTPATIENT)
Dept: CARDIOLOGY | Facility: MEDICAL CENTER | Age: 34
End: 2019-11-18

## 2019-11-18 NOTE — LETTER
November 18, 2019        Josue Ingram  [address]  [place] [address]          Dear Josue,    We have received the results of your recent: Echocardiogram    Your test came back and Dr. Perry reviewed your recent results.  Great news, your results look good!  Please follow up as previously discussed with your physician.      Feel free to call us with any questions.        Sincerely,          Jesenia Perry  Electronically Signed

## 2019-11-18 NOTE — TELEPHONE ENCOUNTER
Result Notes for EC-ECHOCARDIOGRAM REST/STRESS W/O CONT  Message Received: 5 days ago Message Contents   Lexie Perry M.D.  Jesenia Montgomery R.N.     Stress echo looks good      Letter printed with MD recommendations and mailed to pt mailing address.